# Patient Record
Sex: FEMALE | Race: BLACK OR AFRICAN AMERICAN | NOT HISPANIC OR LATINO | Employment: FULL TIME | URBAN - METROPOLITAN AREA
[De-identification: names, ages, dates, MRNs, and addresses within clinical notes are randomized per-mention and may not be internally consistent; named-entity substitution may affect disease eponyms.]

---

## 2023-11-20 ENCOUNTER — TELEPHONE (OUTPATIENT)
Dept: RADIOLOGY | Facility: HOSPITAL | Age: 43
End: 2023-11-20
Payer: COMMERCIAL

## 2023-11-20 DIAGNOSIS — N63.10 LUMP OF RIGHT BREAST: Primary | ICD-10-CM

## 2023-11-20 NOTE — TELEPHONE ENCOUNTER
----- Message from Nahomy Crum sent at 11/20/2023 12:30 PM CST -----  Regarding: Referral  Good afternoon,     I received a referral on behalf of the patient attached requesting a diagnostic mammo and US of the right breast due to lump or mass.  Upon speaking with the patient she found the lump by doing self exam and would like to be scheduled at [facility].  I was unable to schedule, nothing populates.  Please contact the patient to schedule.  The referral order has been scanned into media mgr and the orders have been transcribed.    Thank you,    Nahomy Crum  [facility]